# Patient Record
Sex: FEMALE | Race: WHITE | NOT HISPANIC OR LATINO
[De-identification: names, ages, dates, MRNs, and addresses within clinical notes are randomized per-mention and may not be internally consistent; named-entity substitution may affect disease eponyms.]

---

## 2018-12-07 ENCOUNTER — OTHER (OUTPATIENT)
Age: 83
End: 2018-12-07

## 2018-12-10 ENCOUNTER — FORM ENCOUNTER (OUTPATIENT)
Age: 83
End: 2018-12-10

## 2018-12-11 ENCOUNTER — OUTPATIENT (OUTPATIENT)
Dept: OUTPATIENT SERVICES | Facility: HOSPITAL | Age: 83
LOS: 1 days | End: 2018-12-11
Payer: MEDICARE

## 2018-12-11 ENCOUNTER — APPOINTMENT (OUTPATIENT)
Dept: ORTHOPEDIC SURGERY | Facility: CLINIC | Age: 83
End: 2018-12-11
Payer: MEDICARE

## 2018-12-11 ENCOUNTER — APPOINTMENT (OUTPATIENT)
Dept: RADIOLOGY | Facility: CLINIC | Age: 83
End: 2018-12-11
Payer: MEDICARE

## 2018-12-11 VITALS
DIASTOLIC BLOOD PRESSURE: 66 MMHG | TEMPERATURE: 100.2 F | WEIGHT: 166 LBS | BODY MASS INDEX: 26.06 KG/M2 | HEART RATE: 53 BPM | HEIGHT: 67 IN | SYSTOLIC BLOOD PRESSURE: 146 MMHG

## 2018-12-11 DIAGNOSIS — Z86.79 PERSONAL HISTORY OF OTHER DISEASES OF THE CIRCULATORY SYSTEM: ICD-10-CM

## 2018-12-11 DIAGNOSIS — I25.2 OLD MYOCARDIAL INFARCTION: ICD-10-CM

## 2018-12-11 DIAGNOSIS — M25.559 PAIN IN UNSPECIFIED HIP: ICD-10-CM

## 2018-12-11 DIAGNOSIS — Z78.9 OTHER SPECIFIED HEALTH STATUS: ICD-10-CM

## 2018-12-11 DIAGNOSIS — Z00.8 ENCOUNTER FOR OTHER GENERAL EXAMINATION: ICD-10-CM

## 2018-12-11 PROCEDURE — 99205 OFFICE O/P NEW HI 60 MIN: CPT

## 2018-12-11 PROCEDURE — 72170 X-RAY EXAM OF PELVIS: CPT

## 2018-12-11 PROCEDURE — 72170 X-RAY EXAM OF PELVIS: CPT | Mod: 26

## 2018-12-11 PROCEDURE — 73502 X-RAY EXAM HIP UNI 2-3 VIEWS: CPT | Mod: RT

## 2019-01-29 ENCOUNTER — APPOINTMENT (OUTPATIENT)
Dept: ORTHOPEDIC SURGERY | Facility: CLINIC | Age: 84
End: 2019-01-29
Payer: MEDICARE

## 2019-01-29 VITALS
WEIGHT: 166 LBS | HEIGHT: 67 IN | DIASTOLIC BLOOD PRESSURE: 63 MMHG | SYSTOLIC BLOOD PRESSURE: 133 MMHG | TEMPERATURE: 98.2 F | BODY MASS INDEX: 26.06 KG/M2 | HEART RATE: 54 BPM

## 2019-01-29 PROCEDURE — 99213 OFFICE O/P EST LOW 20 MIN: CPT

## 2019-01-29 NOTE — DISCUSSION/SUMMARY
[de-identified] : The patient is a 85 year old female 3 years s/p right THR with six previous dislocations.The patient was recommended to consider revision right hip replacement surgery due to recurrent instability.  She had another dislocation after seeing us in December.  We discussed alternative options including a hip abduction brace which is very cumbersome and we have decided to try to avoid that at this time.  At this point her  is not willing to allow her to have surgery and she would like to postpone surgery.  We discussed that she is at very high risk for recurrent dislocation and further damage to the hip.  She may follow up with us in the future as needed

## 2019-01-29 NOTE — PHYSICAL EXAM
[de-identified] : The patient appears well nourished  and in no apparent distress.  The patient is alert and oriented to person, place, and time.   Affect and mood appear normal. The head is normocephalic and atraumatic.  The eyes reveal normal sclera and extra ocular muscles are intact. The tongue is midline with no apparent lesions.  Skin shows normal turgor with no evidence of eczema or psoriasis.  No respiratory distress noted.  Sensation grossly intact.\par   [de-identified] : Exam of the right hip shows no ecchymosis over buttock, no erythema, no warmth, hip ROM was deferred due to recent dislocation. Prior posterolateral incision is healed well. 5/5 motor strength bilaterally distally. Sensation intact distally.

## 2019-01-29 NOTE — ADDENDUM
[FreeTextEntry1] : This note was authored by Arpit Bateman working as a medical scribe for Dr. Leno Cannon. The note was reviewed, edited, and revised by Dr. Leno Cannon whom is in agreement with the exam findings, imaging findings, and treatment plan. 01/29/2019.

## 2019-01-29 NOTE — HISTORY OF PRESENT ILLNESS
[de-identified] : The patient is a 85 year old female being seen for evaluation of her right hip. She is 3 years s/p right THR with six previous dislocations. Last seen in the office in December of last year at which time she scheduled right hip revision surgery. She reports having suffered the sixth dislocation a month ago after falling while reciprocating stairs. She is ambulating and transferring with a cane. She is here today to discuss her options.  Her  is not in favor of her having surgery because of the fear of her not responding well to the anesthesia.  He "will not let her" have the surgery.

## 2019-03-06 ENCOUNTER — APPOINTMENT (OUTPATIENT)
Dept: ORTHOPEDIC SURGERY | Facility: HOSPITAL | Age: 84
End: 2019-03-06

## 2019-03-21 ENCOUNTER — APPOINTMENT (OUTPATIENT)
Dept: ORTHOPEDIC SURGERY | Facility: CLINIC | Age: 84
End: 2019-03-21

## 2019-04-19 ENCOUNTER — APPOINTMENT (OUTPATIENT)
Dept: ORTHOPEDIC SURGERY | Facility: CLINIC | Age: 84
End: 2019-04-19

## 2021-06-28 ENCOUNTER — APPOINTMENT (OUTPATIENT)
Dept: ORTHOPEDIC SURGERY | Facility: CLINIC | Age: 86
End: 2021-06-28
Payer: MEDICARE

## 2021-06-28 VITALS
HEART RATE: 74 BPM | WEIGHT: 149 LBS | DIASTOLIC BLOOD PRESSURE: 70 MMHG | SYSTOLIC BLOOD PRESSURE: 121 MMHG | HEIGHT: 67 IN | BODY MASS INDEX: 23.39 KG/M2

## 2021-06-28 DIAGNOSIS — Z96.641 PRESENCE OF RIGHT ARTIFICIAL HIP JOINT: ICD-10-CM

## 2021-06-28 PROCEDURE — 99214 OFFICE O/P EST MOD 30 MIN: CPT

## 2021-06-28 PROCEDURE — 73501 X-RAY EXAM HIP UNI 1 VIEW: CPT | Mod: RT

## 2021-06-28 NOTE — DISCUSSION/SUMMARY
[de-identified] : Impression REcurrent dislocation right total hip recommendation\par Recommendation I recommended to patient and her daughter strong consideration for revision surgery.  For now prescription for hip spica brace.  Referral to Dr. Donato FARRAR for possible revision

## 2021-06-28 NOTE — PHYSICAL EXAM
[de-identified] : Constitutional:Well nourished , well developed and in no acute distress\par Psychiatric: Alert and oriented to time place and person.Appropriate affect \par Skin:Head, neck, arms and lower extremities:no lesions or discoloration\par HEENT: Normocephalic, EOM intact, Nasal septum midline,\par Respiratory: Unlabored respirations,no audible wheezing ,no tachypnea, no cyanosis\par Cardiovascular: no leg swelling  no ankle edema no JVD, pulse regular\par Vascular: no calf or thigh tenderness, \par Peripheral pulses; intact\par Lymphatics:No groin adenopathy,no lymphedema lower  or upper extremities\par Right hip knee immobilizer leg length equal reflexes patellar Achilles absent right and left motor power tibialis anterior EHL peroneals 5/5 [de-identified] : AP pelvis right hip AP lateral reveals right cementless total hip replacement with vertically oriented acetabular component crosstable lateral demonstrates satisfactory anteversion

## 2021-06-28 NOTE — DISCUSSION/SUMMARY
[de-identified] : Impression REcurrent dislocation right total hip recommendation\par Recommendation I recommended to patient and her daughter strong consideration for revision surgery.  For now prescription for hip spica brace.  Referral to Dr. Donato FARRAR for possible revision

## 2021-06-28 NOTE — HISTORY OF PRESENT ILLNESS
[de-identified] : This is an 87-year-old female status post right total hip replacement 8 to 10 years ago.  Patient has experienced 8 dislocations the last occurring a week ago.  All dislocations treated by closed reduction.  Most recently was fitted with a knee immobilizer.  A few years ago was recommended for revision surgery but declined because patient's  did not want patient to have surgery.  Patient inquiring about nonoperative measures to prevent dislocation.  Denies neurovascular symptoms or groin pain.

## 2021-06-28 NOTE — PHYSICAL EXAM
[de-identified] : Constitutional:Well nourished , well developed and in no acute distress\par Psychiatric: Alert and oriented to time place and person.Appropriate affect \par Skin:Head, neck, arms and lower extremities:no lesions or discoloration\par HEENT: Normocephalic, EOM intact, Nasal septum midline,\par Respiratory: Unlabored respirations,no audible wheezing ,no tachypnea, no cyanosis\par Cardiovascular: no leg swelling  no ankle edema no JVD, pulse regular\par Vascular: no calf or thigh tenderness, \par Peripheral pulses; intact\par Lymphatics:No groin adenopathy,no lymphedema lower  or upper extremities\par Right hip knee immobilizer leg length equal reflexes patellar Achilles absent right and left motor power tibialis anterior EHL peroneals 5/5 [de-identified] : AP pelvis right hip AP lateral reveals right cementless total hip replacement with vertically oriented acetabular component crosstable lateral demonstrates satisfactory anteversion

## 2021-06-28 NOTE — HISTORY OF PRESENT ILLNESS
[de-identified] : This is an 87-year-old female status post right total hip replacement 8 to 10 years ago.  Patient has experienced 8 dislocations the last occurring a week ago.  All dislocations treated by closed reduction.  Most recently was fitted with a knee immobilizer.  A few years ago was recommended for revision surgery but declined because patient's  did not want patient to have surgery.  Patient inquiring about nonoperative measures to prevent dislocation.  Denies neurovascular symptoms or groin pain.

## 2022-04-04 ENCOUNTER — NON-APPOINTMENT (OUTPATIENT)
Age: 87
End: 2022-04-04

## 2022-04-04 ENCOUNTER — APPOINTMENT (OUTPATIENT)
Dept: CARDIOLOGY | Facility: CLINIC | Age: 87
End: 2022-04-04
Payer: MEDICARE

## 2022-04-04 VITALS
OXYGEN SATURATION: 94 % | HEART RATE: 89 BPM | BODY MASS INDEX: 25.11 KG/M2 | SYSTOLIC BLOOD PRESSURE: 132 MMHG | HEIGHT: 67 IN | WEIGHT: 160 LBS | DIASTOLIC BLOOD PRESSURE: 72 MMHG

## 2022-04-04 PROCEDURE — 93000 ELECTROCARDIOGRAM COMPLETE: CPT

## 2022-04-04 PROCEDURE — 99214 OFFICE O/P EST MOD 30 MIN: CPT

## 2022-04-04 RX ORDER — CARVEDILOL 3.12 MG/1
TABLET, FILM COATED ORAL
Refills: 0 | Status: DISCONTINUED | COMMUNITY
End: 2022-04-04

## 2022-04-04 RX ORDER — LOSARTAN POTASSIUM 100 MG/1
TABLET, FILM COATED ORAL
Refills: 0 | Status: DISCONTINUED | COMMUNITY
End: 2022-04-04

## 2022-04-04 RX ORDER — CIPROFLOXACIN HYDROCHLORIDE 500 MG/1
500 TABLET, FILM COATED ORAL
Qty: 20 | Refills: 0 | Status: ACTIVE | COMMUNITY
Start: 2022-03-28

## 2022-04-04 NOTE — PHYSICAL EXAM
[Normal Rate] : normal [Rhythm Regular] : regular [Normal S1] : normal S1 [Normal S2] : normal S2 [I] : a grade 1 [Nonpitting Edema] : nonpitting edema present [1+] : left 1+ [Right Carotid Bruit] : no bruit heard over the right carotid [Left Carotid Bruit] : no bruit heard over the left carotid [No Cyanosis] : no cyanosis [No Clubbing] : no clubbing [Normal] : alert and oriented, normal memory [de-identified] : walks with ann

## 2022-04-04 NOTE — CARDIOLOGY SUMMARY
[de-identified] : 4/4/22 Sinus  Rhythm  - frequent PAC s \par Voltage criteria for LVH   \par  -Anteroseptal infarct  \par  -Nonspecific ST depression  [de-identified] : 2012

## 2022-04-04 NOTE — DISCUSSION/SUMMARY
[FreeTextEntry1] : 88 year woman with a history as listed presents for an initial cardiac evaluation. \par Randee is complaining of worsening hiip pain and needs a revision of her previous replacement. She denies any anginal symptoms. Clinically she is euvolemic on exam. THough she is signficantly dyspneic. She has not seen a cardiologist in years. She will continue ASA for her PCI history. She will undergo a pharmacological nuclear stress test to assess for underlying obstructive CAD. She will get a 2d echo to assess for any  new structural heart disease, changes in valvular and ventricular function. \par For now will continue with Nifedipine. \par She never took a statin. \par She will have her baseline lab work, including lipids, done prior to the next visit. \par She will followup with me in 1-2 month or sooner if necessary.

## 2022-04-04 NOTE — HISTORY OF PRESENT ILLNESS
[FreeTextEntry1] : 88 year old woman with a history of a CAD s/p PCI in 2012, HTN presents an initial cardiac evaluation. \par \par In 2012 she had a hip replacement that resulted in a post op MI which required a PCI. She has not had any real regular followup with any physician. \par \par She has been very sedentary given her hip issues. She has MARTIN with walking longer distances. She   denies any chest pain, PND, orthopnea, lower extremity edema, near syncope, syncope, strokelike symptoms. \par Medication reconciliation performed. She is compliant with her medications.

## 2022-04-06 ENCOUNTER — NON-APPOINTMENT (OUTPATIENT)
Age: 87
End: 2022-04-06

## 2022-04-08 ENCOUNTER — APPOINTMENT (OUTPATIENT)
Dept: CARDIOLOGY | Facility: CLINIC | Age: 87
End: 2022-04-08
Payer: MEDICARE

## 2022-04-08 PROCEDURE — 93306 TTE W/DOPPLER COMPLETE: CPT

## 2022-04-08 PROCEDURE — 78452 HT MUSCLE IMAGE SPECT MULT: CPT

## 2022-04-08 PROCEDURE — A9500: CPT

## 2022-04-08 PROCEDURE — 93015 CV STRESS TEST SUPVJ I&R: CPT

## 2022-04-11 LAB
25(OH)D3 SERPL-MCNC: 157 NG/ML
ALBUMIN SERPL ELPH-MCNC: 4.2 G/DL
ALP BLD-CCNC: 70 U/L
ALT SERPL-CCNC: 9 U/L
ANION GAP SERPL CALC-SCNC: 12 MMOL/L
AST SERPL-CCNC: 15 U/L
BASOPHILS # BLD AUTO: 0.05 K/UL
BASOPHILS NFR BLD AUTO: 0.7 %
BILIRUB SERPL-MCNC: 0.4 MG/DL
BUN SERPL-MCNC: 17 MG/DL
CALCIUM SERPL-MCNC: 9.9 MG/DL
CHLORIDE SERPL-SCNC: 100 MMOL/L
CHOLEST SERPL-MCNC: 180 MG/DL
CO2 SERPL-SCNC: 28 MMOL/L
CREAT SERPL-MCNC: 0.86 MG/DL
EGFR: 65 ML/MIN/1.73M2
EOSINOPHIL # BLD AUTO: 1.02 K/UL
EOSINOPHIL NFR BLD AUTO: 14.2 %
ESTIMATED AVERAGE GLUCOSE: 120 MG/DL
FOLATE SERPL-MCNC: 11.2 NG/ML
GLUCOSE SERPL-MCNC: 83 MG/DL
HBA1C MFR BLD HPLC: 5.8 %
HCT VFR BLD CALC: 43.7 %
HDLC SERPL-MCNC: 68 MG/DL
HGB BLD-MCNC: 13.7 G/DL
IMM GRANULOCYTES NFR BLD AUTO: 0.1 %
LDLC SERPL CALC-MCNC: 97 MG/DL
LYMPHOCYTES # BLD AUTO: 1.98 K/UL
LYMPHOCYTES NFR BLD AUTO: 27.6 %
MAN DIFF?: NORMAL
MCHC RBC-ENTMCNC: 29 PG
MCHC RBC-ENTMCNC: 31.4 GM/DL
MCV RBC AUTO: 92.4 FL
MONOCYTES # BLD AUTO: 0.71 K/UL
MONOCYTES NFR BLD AUTO: 9.9 %
NEUTROPHILS # BLD AUTO: 3.41 K/UL
NEUTROPHILS NFR BLD AUTO: 47.5 %
NONHDLC SERPL-MCNC: 112 MG/DL
PLATELET # BLD AUTO: 220 K/UL
POTASSIUM SERPL-SCNC: 4.2 MMOL/L
PROT SERPL-MCNC: 7.5 G/DL
RBC # BLD: 4.73 M/UL
RBC # FLD: 13 %
SODIUM SERPL-SCNC: 140 MMOL/L
TRIGL SERPL-MCNC: 74 MG/DL
TSH SERPL-ACNC: 3.58 UIU/ML
VIT B12 SERPL-MCNC: 596 PG/ML
WBC # FLD AUTO: 7.18 K/UL

## 2022-04-20 RX ORDER — CARVEDILOL 6.25 MG/1
6.25 TABLET, FILM COATED ORAL
Qty: 180 | Refills: 2 | Status: ACTIVE | COMMUNITY
Start: 2022-04-20

## 2022-04-20 RX ORDER — NIFEDIPINE 30 MG/1
30 TABLET, EXTENDED RELEASE ORAL
Qty: 180 | Refills: 0 | Status: DISCONTINUED | COMMUNITY
Start: 2021-06-04 | End: 2022-04-20

## 2022-06-06 ENCOUNTER — NON-APPOINTMENT (OUTPATIENT)
Age: 87
End: 2022-06-06

## 2022-06-06 ENCOUNTER — APPOINTMENT (OUTPATIENT)
Dept: CARDIOLOGY | Facility: CLINIC | Age: 87
End: 2022-06-06
Payer: MEDICARE

## 2022-06-06 VITALS
OXYGEN SATURATION: 95 % | SYSTOLIC BLOOD PRESSURE: 150 MMHG | BODY MASS INDEX: 25.43 KG/M2 | HEART RATE: 50 BPM | DIASTOLIC BLOOD PRESSURE: 65 MMHG | HEIGHT: 67 IN | WEIGHT: 162 LBS

## 2022-06-06 DIAGNOSIS — R94.39 ABNORMAL RESULT OF OTHER CARDIOVASCULAR FUNCTION STUDY: ICD-10-CM

## 2022-06-06 PROCEDURE — 93000 ELECTROCARDIOGRAM COMPLETE: CPT

## 2022-06-06 PROCEDURE — 99215 OFFICE O/P EST HI 40 MIN: CPT

## 2022-06-06 NOTE — REVIEW OF SYSTEMS
[Dyspnea on exertion] : dyspnea during exertion [Joint Pain] : joint pain [Negative] : Heme/Lymph done

## 2022-06-06 NOTE — CARDIOLOGY SUMMARY
[de-identified] : SR with LVH nonspecific T wave  [de-identified] : 4/8/22 pharm nuc small mild defect in the mid to distal anterior wall that is partially reversible mild ischemia  [de-identified] : 4/8/22 subtle hypokinesis with overall normal LV function.  [de-identified] : 2012

## 2022-06-06 NOTE — DISCUSSION/SUMMARY
[FreeTextEntry1] : 88 year woman with a history as listed presents for a followup cardiac evaluation. \par Randee is complaining of more worsening MARTIN and has an abnormal nuclear stress test. I offered her a coronary angiogram but she is reluctant to move forward and would like medical management. She will continue Coreg as her HR is controlled. Add Imdur 30mg Qday. \par She will continue ASA 81mg Qday. She never took a statin and is reluctant to start it. \par She will have her baseline lab work, including lipids, done prior to the next visit. \par She will followup with me in 2-4 weeks month or sooner if necessary.

## 2022-06-06 NOTE — PHYSICAL EXAM
[Normal Rate] : normal [Rhythm Regular] : regular [Normal S1] : normal S1 [Normal S2] : normal S2 [I] : a grade 1 [Nonpitting Edema] : nonpitting edema present [1+] : left 1+ [No Cyanosis] : no cyanosis [No Clubbing] : no clubbing [Normal] : alert and oriented, normal memory [Right Carotid Bruit] : no bruit heard over the right carotid [Left Carotid Bruit] : no bruit heard over the left carotid [de-identified] : walks with ann

## 2022-06-06 NOTE — HISTORY OF PRESENT ILLNESS
[FreeTextEntry1] : 88 year old woman with a history of a CAD s/p PCI in 2012, HTN presents an initial cardiac evaluation. \par \par In 2012 she had a hip replacement that resulted in a post op MI which required a PCI. She has not had any real regular followup with any physician. \par \par Since her last visit, she has had more MARTIN on walking.  She   denies any chest pain, PND, orthopnea, lower extremity edema, near syncope, syncope, strokelike symptoms. \par Medication reconciliation performed. She is compliant with her medications.

## 2022-06-29 ENCOUNTER — NON-APPOINTMENT (OUTPATIENT)
Age: 87
End: 2022-06-29

## 2022-06-29 ENCOUNTER — APPOINTMENT (OUTPATIENT)
Dept: CARDIOLOGY | Facility: CLINIC | Age: 87
End: 2022-06-29

## 2022-06-29 VITALS
HEART RATE: 58 BPM | OXYGEN SATURATION: 93 % | HEIGHT: 67 IN | SYSTOLIC BLOOD PRESSURE: 150 MMHG | DIASTOLIC BLOOD PRESSURE: 72 MMHG

## 2022-06-29 VITALS — SYSTOLIC BLOOD PRESSURE: 130 MMHG | DIASTOLIC BLOOD PRESSURE: 60 MMHG

## 2022-06-29 DIAGNOSIS — R06.00 DYSPNEA, UNSPECIFIED: ICD-10-CM

## 2022-06-29 PROCEDURE — 99215 OFFICE O/P EST HI 40 MIN: CPT

## 2022-06-29 PROCEDURE — 93000 ELECTROCARDIOGRAM COMPLETE: CPT

## 2022-06-30 DIAGNOSIS — Z01.818 ENCOUNTER FOR OTHER PREPROCEDURAL EXAMINATION: ICD-10-CM

## 2022-07-01 ENCOUNTER — NON-APPOINTMENT (OUTPATIENT)
Age: 87
End: 2022-07-01

## 2022-07-01 LAB
ALBUMIN SERPL ELPH-MCNC: 4.2 G/DL
ALP BLD-CCNC: 81 U/L
ALT SERPL-CCNC: 9 U/L
ANION GAP SERPL CALC-SCNC: 14 MMOL/L
AST SERPL-CCNC: 19 U/L
BASOPHILS # BLD AUTO: 0.04 K/UL
BASOPHILS NFR BLD AUTO: 0.7 %
BILIRUB SERPL-MCNC: 0.6 MG/DL
BUN SERPL-MCNC: 15 MG/DL
CALCIUM SERPL-MCNC: 9.7 MG/DL
CHLORIDE SERPL-SCNC: 99 MMOL/L
CHOLEST SERPL-MCNC: 187 MG/DL
CO2 SERPL-SCNC: 25 MMOL/L
CREAT SERPL-MCNC: 0.87 MG/DL
EGFR: 64 ML/MIN/1.73M2
EOSINOPHIL # BLD AUTO: 0.05 K/UL
EOSINOPHIL NFR BLD AUTO: 0.9 %
GLUCOSE SERPL-MCNC: 99 MG/DL
HCT VFR BLD CALC: 45.4 %
HDLC SERPL-MCNC: 50 MG/DL
HGB BLD-MCNC: 14.1 G/DL
IMM GRANULOCYTES NFR BLD AUTO: 0.7 %
LDLC SERPL CALC-MCNC: 115 MG/DL
LYMPHOCYTES # BLD AUTO: 1.2 K/UL
LYMPHOCYTES NFR BLD AUTO: 22.5 %
MAN DIFF?: NORMAL
MCHC RBC-ENTMCNC: 28.1 PG
MCHC RBC-ENTMCNC: 31.1 GM/DL
MCV RBC AUTO: 90.4 FL
MONOCYTES # BLD AUTO: 0.55 K/UL
MONOCYTES NFR BLD AUTO: 10.3 %
NEUTROPHILS # BLD AUTO: 3.46 K/UL
NEUTROPHILS NFR BLD AUTO: 64.9 %
NONHDLC SERPL-MCNC: 137 MG/DL
PLATELET # BLD AUTO: 214 K/UL
POTASSIUM SERPL-SCNC: 4.4 MMOL/L
PROT SERPL-MCNC: 8.3 G/DL
RBC # BLD: 5.02 M/UL
RBC # FLD: 14.3 %
SODIUM SERPL-SCNC: 138 MMOL/L
TRIGL SERPL-MCNC: 108 MG/DL
WBC # FLD AUTO: 5.34 K/UL

## 2022-07-01 NOTE — REVIEW OF SYSTEMS
[Joint Pain] : joint pain [Negative] : Heme/Lymph [FreeTextEntry5] : see HPI [FreeTextEntry9] : back pains

## 2022-07-01 NOTE — DISCUSSION/SUMMARY
[FreeTextEntry1] : 88 year woman with a history as listed presents for a followup cardiac evaluation. \par \par Randee is in no acute distress today.  She appears euvolemic on exam.  ECG illustrates normal sinus rhythm, unchanged from previous.  Her blood initially was elevated but improved after resting awhile in the office. \par Her dyspnea and chest pressures have improved since starting imdur 30mg, I have explained in detail with Randee and her DTR Danna that with improvement in symptoms while on nitrates this is reinforcing my suspicion for coronary artery disease and active blockage. She has agreed to proceed with scheduling a coronary angiogram.  In the mean time, I will increase imdur to BID dosing (30mg) and will add Ranexa 500mg BID to her regimen.  she will continue coreg 6.25mg BID as well.\par she will continue ASA 81\par \par I will send her for blood work to evaluate kidney function post fall and in preparation for angiogram.  Will also check LIPID panel .    \par \par She will followup with me in august after angiogram is complete. Sooner if necessary. \par she knows to present to the ED with worsening symptoms.

## 2022-07-01 NOTE — PHYSICAL EXAM
[Normal Rate] : normal [Rhythm Regular] : regular [Normal S1] : normal S1 [Normal S2] : normal S2 [I] : a grade 1 [1+] : left 1+ [No Cyanosis] : no cyanosis [No Clubbing] : no clubbing [Normal] : alert and oriented, normal memory [II] : a grade 2 [No Pitting Edema] : no pitting edema present [Bibasilar Rales/Crackles] : bibasilar rales/crackles were heard [Rales / Crackles Bilateral Bases] : crackles were heard over both bases [Right Carotid Bruit] : no bruit heard over the right carotid [Left Carotid Bruit] : no bruit heard over the left carotid [de-identified] : walks with ann

## 2022-07-01 NOTE — HISTORY OF PRESENT ILLNESS
[FreeTextEntry1] : 88 year old woman with a history of a CAD s/p PCI in 2012, HTN presents an initial cardiac evaluation. \par \par In 2012 she had a hip replacement that resulted in a post op MI which required a PCI. She has not had any real regular followup with any physician. \par \par Previous visit History:\par \par Since her last visit, she has had more MARTIN on walking.  She   denies any chest pain, PND, orthopnea, lower extremity edema, near syncope, syncope, strokelike symptoms. \par Medication reconciliation performed. She is compliant with her medications. \par \par Randee presents back today 6/29/22 for follow up:\par She unfortunately had a fall over a week ago in her home.  Her report of the event was most consistent with a mechanical fall.  she denies dizzy spell, denies loss of consciousness.  the fall was unwitnessed.  After the fall, she was unable to stand due to pain, so she spent the night on the floor until the next morning.  SHe did not seek emergency assessment.   today, she still experiences pain from the right side of her lower back.  She denied chest pain or pressure, denied palpitations at the time. \par \par Overall, today she reports an improvement with her breathlessness since starting the Imdur 30mg daily.  she is able to ambulate with the walker without chest pains or pressure, and breathing is "better".

## 2022-07-01 NOTE — CARDIOLOGY SUMMARY
[de-identified] : SR with LVH nonspecific T wave  [de-identified] : 4/8/22 pharm nuc small mild defect in the mid to distal anterior wall that is partially reversible mild ischemia  [de-identified] : 4/8/22 subtle hypokinesis with overall normal LV function.  [de-identified] : 2012

## 2022-07-01 NOTE — END OF VISIT
[FreeTextEntry3] : I saw and evaluated the patient and discussed the care with the NP provider above on 06/29/2022 . I agree with the findings and plan as documented in the note above. agrees to coornary angiography given anginal sx. will uptitrate anti anginal meds as above. if sx worsens advised to go to ed.

## 2022-07-05 ENCOUNTER — OUTPATIENT (OUTPATIENT)
Dept: OUTPATIENT SERVICES | Facility: HOSPITAL | Age: 87
LOS: 1 days | End: 2022-07-05
Payer: MEDICARE

## 2022-07-05 ENCOUNTER — TRANSCRIPTION ENCOUNTER (OUTPATIENT)
Age: 87
End: 2022-07-05

## 2022-07-05 VITALS
RESPIRATION RATE: 17 BRPM | HEART RATE: 58 BPM | TEMPERATURE: 98 F | OXYGEN SATURATION: 97 % | DIASTOLIC BLOOD PRESSURE: 121 MMHG | SYSTOLIC BLOOD PRESSURE: 138 MMHG

## 2022-07-05 VITALS
SYSTOLIC BLOOD PRESSURE: 171 MMHG | HEIGHT: 67 IN | DIASTOLIC BLOOD PRESSURE: 76 MMHG | WEIGHT: 149.91 LBS | HEART RATE: 61 BPM | RESPIRATION RATE: 18 BRPM | OXYGEN SATURATION: 97 % | TEMPERATURE: 98 F

## 2022-07-05 DIAGNOSIS — Z96.641 PRESENCE OF RIGHT ARTIFICIAL HIP JOINT: Chronic | ICD-10-CM

## 2022-07-05 DIAGNOSIS — R94.39 ABNORMAL RESULT OF OTHER CARDIOVASCULAR FUNCTION STUDY: ICD-10-CM

## 2022-07-05 DIAGNOSIS — Z96.653 PRESENCE OF ARTIFICIAL KNEE JOINT, BILATERAL: Chronic | ICD-10-CM

## 2022-07-05 LAB
ALBUMIN SERPL ELPH-MCNC: 3.7 G/DL — SIGNIFICANT CHANGE UP (ref 3.3–5)
ALP SERPL-CCNC: 135 U/L — HIGH (ref 40–120)
ALT FLD-CCNC: 11 U/L — SIGNIFICANT CHANGE UP (ref 10–45)
ANION GAP SERPL CALC-SCNC: 12 MMOL/L — SIGNIFICANT CHANGE UP (ref 5–17)
AST SERPL-CCNC: 23 U/L — SIGNIFICANT CHANGE UP (ref 10–40)
BILIRUB SERPL-MCNC: 0.6 MG/DL — SIGNIFICANT CHANGE UP (ref 0.2–1.2)
BUN SERPL-MCNC: 13 MG/DL — SIGNIFICANT CHANGE UP (ref 7–23)
CALCIUM SERPL-MCNC: 10 MG/DL — SIGNIFICANT CHANGE UP (ref 8.4–10.5)
CHLORIDE SERPL-SCNC: 103 MMOL/L — SIGNIFICANT CHANGE UP (ref 96–108)
CO2 SERPL-SCNC: 25 MMOL/L — SIGNIFICANT CHANGE UP (ref 22–31)
CREAT SERPL-MCNC: 0.85 MG/DL — SIGNIFICANT CHANGE UP (ref 0.5–1.3)
EGFR: 66 ML/MIN/1.73M2 — SIGNIFICANT CHANGE UP
GLUCOSE SERPL-MCNC: 99 MG/DL — SIGNIFICANT CHANGE UP (ref 70–99)
HCT VFR BLD CALC: 41.9 % — SIGNIFICANT CHANGE UP (ref 34.5–45)
HGB BLD-MCNC: 13.5 G/DL — SIGNIFICANT CHANGE UP (ref 11.5–15.5)
MCHC RBC-ENTMCNC: 29 PG — SIGNIFICANT CHANGE UP (ref 27–34)
MCHC RBC-ENTMCNC: 32.2 GM/DL — SIGNIFICANT CHANGE UP (ref 32–36)
MCV RBC AUTO: 90.1 FL — SIGNIFICANT CHANGE UP (ref 80–100)
NRBC # BLD: 0 /100 WBCS — SIGNIFICANT CHANGE UP (ref 0–0)
PLATELET # BLD AUTO: 170 K/UL — SIGNIFICANT CHANGE UP (ref 150–400)
POTASSIUM SERPL-MCNC: 5.5 MMOL/L — HIGH (ref 3.5–5.3)
POTASSIUM SERPL-SCNC: 5.5 MMOL/L — HIGH (ref 3.5–5.3)
PROT SERPL-MCNC: 8.1 G/DL — SIGNIFICANT CHANGE UP (ref 6–8.3)
RBC # BLD: 4.65 M/UL — SIGNIFICANT CHANGE UP (ref 3.8–5.2)
RBC # FLD: 14.2 % — SIGNIFICANT CHANGE UP (ref 10.3–14.5)
SODIUM SERPL-SCNC: 140 MMOL/L — SIGNIFICANT CHANGE UP (ref 135–145)
WBC # BLD: 5.13 K/UL — SIGNIFICANT CHANGE UP (ref 3.8–10.5)
WBC # FLD AUTO: 5.13 K/UL — SIGNIFICANT CHANGE UP (ref 3.8–10.5)

## 2022-07-05 PROCEDURE — C1894: CPT

## 2022-07-05 PROCEDURE — 93010 ELECTROCARDIOGRAM REPORT: CPT | Mod: 77

## 2022-07-05 PROCEDURE — 92928 PRQ TCAT PLMT NTRAC ST 1 LES: CPT | Mod: LD

## 2022-07-05 PROCEDURE — C1769: CPT

## 2022-07-05 PROCEDURE — 93571 IV DOP VEL&/PRESS C FLO 1ST: CPT | Mod: LD

## 2022-07-05 PROCEDURE — 85027 COMPLETE CBC AUTOMATED: CPT

## 2022-07-05 PROCEDURE — 93571 IV DOP VEL&/PRESS C FLO 1ST: CPT | Mod: 26,LD

## 2022-07-05 PROCEDURE — 99152 MOD SED SAME PHYS/QHP 5/>YRS: CPT

## 2022-07-05 PROCEDURE — 80053 COMPREHEN METABOLIC PANEL: CPT

## 2022-07-05 PROCEDURE — 93005 ELECTROCARDIOGRAM TRACING: CPT

## 2022-07-05 PROCEDURE — 93010 ELECTROCARDIOGRAM REPORT: CPT

## 2022-07-05 PROCEDURE — C9600: CPT | Mod: LD

## 2022-07-05 PROCEDURE — 93454 CORONARY ARTERY ANGIO S&I: CPT | Mod: 26,59

## 2022-07-05 PROCEDURE — 93454 CORONARY ARTERY ANGIO S&I: CPT | Mod: 59

## 2022-07-05 PROCEDURE — C1887: CPT

## 2022-07-05 PROCEDURE — C1874: CPT

## 2022-07-05 PROCEDURE — 36415 COLL VENOUS BLD VENIPUNCTURE: CPT

## 2022-07-05 RX ORDER — SODIUM CHLORIDE 9 MG/ML
1000 INJECTION INTRAMUSCULAR; INTRAVENOUS; SUBCUTANEOUS
Refills: 0 | Status: DISCONTINUED | OUTPATIENT
Start: 2022-07-05 | End: 2022-07-19

## 2022-07-05 RX ORDER — ISOSORBIDE MONONITRATE 60 MG/1
1 TABLET, EXTENDED RELEASE ORAL
Qty: 0 | Refills: 0 | DISCHARGE

## 2022-07-05 RX ORDER — ASPIRIN/CALCIUM CARB/MAGNESIUM 324 MG
1 TABLET ORAL
Qty: 0 | Refills: 0 | DISCHARGE

## 2022-07-05 RX ORDER — SODIUM CHLORIDE 9 MG/ML
3 INJECTION INTRAMUSCULAR; INTRAVENOUS; SUBCUTANEOUS EVERY 8 HOURS
Refills: 0 | Status: DISCONTINUED | OUTPATIENT
Start: 2022-07-05 | End: 2022-07-19

## 2022-07-05 RX ORDER — CLOPIDOGREL BISULFATE 75 MG/1
1 TABLET, FILM COATED ORAL
Qty: 30 | Refills: 6
Start: 2022-07-05 | End: 2023-01-30

## 2022-07-05 RX ORDER — CARVEDILOL PHOSPHATE 80 MG/1
1 CAPSULE, EXTENDED RELEASE ORAL
Qty: 0 | Refills: 0 | DISCHARGE

## 2022-07-05 RX ADMIN — SODIUM CHLORIDE 3 MILLILITER(S): 9 INJECTION INTRAMUSCULAR; INTRAVENOUS; SUBCUTANEOUS at 13:01

## 2022-07-05 RX ADMIN — Medication 200 MILLIGRAM(S): at 17:20

## 2022-07-05 RX ADMIN — SODIUM CHLORIDE 75 MILLILITER(S): 9 INJECTION INTRAMUSCULAR; INTRAVENOUS; SUBCUTANEOUS at 13:00

## 2022-07-05 NOTE — CHART NOTE - NSCHARTNOTEFT_GEN_A_CORE
Removal of Femoral Sheath    Pulses in the right lower extremity are palpable. The patient was placed in the supine position. The insertion site was identified and the sutures were removed per protocol.  The 6 [place] femoral sheath was then removed. Direct pressure was applied for  __18___ minutes.     Monitoring of the right groin and both lower extremities including neuro-vascular checks and vital signs every 15 minutes x 4, then every 30 minutes x 2, then every 1 hour was ordered.    Gauze and Tegaderm dressing placed. Patient instructed to keep extremity straight and that nursing staff will inform them when they can sit up.     Complications: none    Comments:     [facility]  Invasive Cardiology  [phone]

## 2022-07-05 NOTE — ASU DISCHARGE PLAN (ADULT/PEDIATRIC) - CARE PROVIDER_API CALL
Yarely Gastelum)  Internal Medicine  [address] [place] [address]  Phone: [phone]  Fax: [phone]  Follow Up Time: 2 weeks

## 2022-07-05 NOTE — H&P CARDIOLOGY - NSICDXPASTMEDICALHX_GEN_ALL_CORE_FT
PAST MEDICAL HISTORY:  CAD (coronary artery disease)     HLD (hyperlipidemia)     HTN (hypertension)     NSTEMI (non-ST elevated myocardial infarction)

## 2022-07-05 NOTE — H&P CARDIOLOGY - HISTORY OF PRESENT ILLNESS
This is a 89 yo female PMH HTN, HLD, hip replacement 2012 postop MI requiring PCI who presented to Dr. VLAD Gastelum to establish cardiac care with complaints of SOB/MARTIN when walking.  Denies chest pain/pressure, dizziness, diaphoresis, palpitations, nausea, vomiting, peripheral edema, recent weight gain, or syncope.  No implanted monitoring devices. NST 4/8/22 no results available.  Pt. presents asymptomatic for further evaluation and cardiac cath. This is a 87 yo female PMH HTN, HLD, history of falls utilizes cane/walker, hip replacement 2012 postop MI requiring PCI who presented to Dr. VLAD Gastelum to establish cardiac care with complaints of SOB/MARTIN when walking.  Denies chest pain/pressure, dizziness, diaphoresis, palpitations, nausea, vomiting, peripheral edema, recent weight gain, or syncope.  No implanted monitoring devices. NST 4/8/22 no results available.  Pt. presents asymptomatic for further evaluation and cardiac cath.

## 2022-07-05 NOTE — ASU DISCHARGE PLAN (ADULT/PEDIATRIC) - NS MD DC FALL RISK RISK
For information on Fall & Injury Prevention, visit: [email or web address] OR  [email or web address] OR  https://www.cdc.gov/steadi/patient.html

## 2022-07-05 NOTE — ASU PATIENT PROFILE, ADULT - FALL HARM RISK - HARM RISK INTERVENTIONS

## 2022-07-06 LAB — SARS-COV-2 N GENE NPH QL NAA+PROBE: NOT DETECTED

## 2022-07-11 PROBLEM — I21.4 NON-ST ELEVATION (NSTEMI) MYOCARDIAL INFARCTION: Chronic | Status: ACTIVE | Noted: 2022-07-05

## 2022-07-11 PROBLEM — I25.10 ATHEROSCLEROTIC HEART DISEASE OF NATIVE CORONARY ARTERY WITHOUT ANGINA PECTORIS: Chronic | Status: ACTIVE | Noted: 2022-07-05

## 2022-07-11 PROBLEM — E78.5 HYPERLIPIDEMIA, UNSPECIFIED: Chronic | Status: ACTIVE | Noted: 2022-07-05

## 2022-07-11 PROBLEM — I10 ESSENTIAL (PRIMARY) HYPERTENSION: Chronic | Status: ACTIVE | Noted: 2022-07-05

## 2022-07-20 ENCOUNTER — APPOINTMENT (OUTPATIENT)
Dept: CARDIOLOGY | Facility: CLINIC | Age: 87
End: 2022-07-20

## 2022-07-20 ENCOUNTER — NON-APPOINTMENT (OUTPATIENT)
Age: 87
End: 2022-07-20

## 2022-07-20 VITALS — SYSTOLIC BLOOD PRESSURE: 122 MMHG | DIASTOLIC BLOOD PRESSURE: 62 MMHG

## 2022-07-20 VITALS
HEART RATE: 65 BPM | WEIGHT: 155 LBS | SYSTOLIC BLOOD PRESSURE: 167 MMHG | OXYGEN SATURATION: 93 % | DIASTOLIC BLOOD PRESSURE: 85 MMHG | BODY MASS INDEX: 24.33 KG/M2 | HEIGHT: 67 IN

## 2022-07-20 PROCEDURE — 93000 ELECTROCARDIOGRAM COMPLETE: CPT

## 2022-07-20 PROCEDURE — 99214 OFFICE O/P EST MOD 30 MIN: CPT

## 2022-07-20 PROCEDURE — 93880 EXTRACRANIAL BILAT STUDY: CPT

## 2022-07-20 RX ORDER — RANOLAZINE 500 MG/1
500 TABLET, EXTENDED RELEASE ORAL
Qty: 60 | Refills: 3 | Status: DISCONTINUED | COMMUNITY
Start: 2022-06-29 | End: 2022-07-20

## 2022-07-20 RX ORDER — CETIRIZINE HYDROCHLORIDE 10 MG/1
10 TABLET, COATED ORAL
Qty: 10 | Refills: 0 | Status: ACTIVE | COMMUNITY
Start: 2022-06-30

## 2022-07-27 NOTE — DISCUSSION/SUMMARY
[FreeTextEntry1] : 88 year woman with a history as listed presents for a followup cardiac evaluation. \par \par Randee is in no acute distress today.  She appears euvolemic on exam.  ECG illustrates normal sinus rhythm, unchanged from previous.  Her blood pressure initially was elevated but improved after resting awhile in the office. \par She is s/p ASTON to pLAD and POBA to mLAD on 7/5/22.\par Her dyspnea and chest pressures have improved since the angiogram and stenting. She will continue imdur 30mg but is not sure if she is taking it twice a day as was previously prescribed.  She will benefit from continuing given small vessel disease.\par Being she  is not sure if she is taking  Ranexa 500mg BID, at this time, I have advised that she stop given she no longer is having symptoms. \par She will continue coreg 6.25mg BID, she will continue ASA 81 and plavix 75mg. \par \par I have asked that she start low dose statin, Lipitor 20mg daily for goal LDL of 100, ideally less than 70.\par will send her for Carotid Doppler as well.\par \par Regarding her HIP, I have informed her that she would have to continue ASA/Plavix uninterrupted for minimum of 3 months.  In the mean time, she and her family can find an orthopedic that she prefers to follow up with . \par Physical therapy for gait training is encouraged. \par \par \par She will follow up with me in September\par  [EKG obtained to assist in diagnosis and management of assessed problem(s)] : EKG obtained to assist in diagnosis and management of assessed problem(s)

## 2022-07-27 NOTE — END OF VISIT
[FreeTextEntry3] : I saw and evaluated the patient and discussed the care with the NP provider above on 07/20/2022 . I agree with the findings and plan as documented in the note above. cont dapt. optimized meds. compliance stressed.

## 2022-07-27 NOTE — HISTORY OF PRESENT ILLNESS
[FreeTextEntry1] : 88 year old woman with a history of a CAD s/p PCI in 2012, HTN presents an initial cardiac evaluation. \par \par In 2012 she had a hip replacement that resulted in a post op MI which required a PCI. She has not had any real regular followup with any physician. \par \par Previous visit History:\par \par Since her last visit, she has had more MARTIN on walking.  She   denies any chest pain, PND, orthopnea, lower extremity edema, near syncope, syncope, strokelike symptoms. \par Medication reconciliation performed. She is compliant with her medications. \par jama Jeff presents back today 6/29/22 for follow up:\par She unfortunately had a fall over a week ago in her home.  Her report of the event was most consistent with a mechanical fall.  she denies dizzy spell, denies loss of consciousness.  the fall was unwitnessed.  After the fall, she was unable to stand due to pain, so she spent the night on the floor until the next morning.  SHe did not seek emergency assessment.   today, she still experiences pain from the right side of her lower back.  She denied chest pain or pressure, denied palpitations at the time. \par \par Overall, today she reports an improvement with her breathlessness since starting the Imdur 30mg daily.  she is able to ambulate with the walker without chest pains or pressure, and breathing is "better".\par \par jama Jeff presents today 7/20/22 for hospital follow up :\par She is s/p angiogram 7/5/22 with pLAD ASTON placement and mLAD balloon angioplasty of previous stent.\par she feels well, breathing has improved greatly.  Denies chest pains.\[place] Still has not been able to get around much walking. Still using cane and walker.  Was seen by her PCP, was told she may have a pelvis fracture from prior fall.  she will eventually need followup with orthopedic regarding her HIP.\par \par Med reconciliation performed, she is a bit unsure of all of her medications and frequency, she is not sure if she is taking renexa and is unsure of Imdur frequency. \par She is taking plavix and ASA 81 \par \par

## 2022-07-27 NOTE — CARDIOLOGY SUMMARY
[de-identified] : SR with LVH nonspecific T wave  [de-identified] : 4/8/22 pharm nuc small mild defect in the mid to distal anterior wall that is partially reversible mild ischemia  [de-identified] : 4/8/22 subtle hypokinesis with overall normal LV function. mild MR, mild TR [de-identified] : 2012 \par \par 7/5/2022\par ASTON pLAD\par POBA mLAD (prior stent, 2 locations w/worsening stenosis)

## 2022-07-27 NOTE — PHYSICAL EXAM
[Normal Rate] : normal [Rhythm Regular] : regular [Normal S1] : normal S1 [Normal S2] : normal S2 [I] : a grade 1 [II] : a grade 2 [No Pitting Edema] : no pitting edema present [1+] : left 1+ [Bibasilar Rales/Crackles] : bibasilar rales/crackles were heard [Rales / Crackles Bilateral Bases] : crackles were heard over both bases [No Cyanosis] : no cyanosis [No Clubbing] : no clubbing [Normal] : alert and oriented, normal memory [Rt] : varicose veins of the right leg noted [Lt] : varicose veins of the left leg noted [Right Carotid Bruit] : no bruit heard over the right carotid [Left Carotid Bruit] : no bruit heard over the left carotid [de-identified] : walks with ann

## 2022-09-28 ENCOUNTER — NON-APPOINTMENT (OUTPATIENT)
Age: 87
End: 2022-09-28

## 2022-09-28 ENCOUNTER — APPOINTMENT (OUTPATIENT)
Dept: CARDIOLOGY | Facility: CLINIC | Age: 87
End: 2022-09-28

## 2022-09-28 VITALS
OXYGEN SATURATION: 96 % | BODY MASS INDEX: 24.33 KG/M2 | WEIGHT: 155 LBS | DIASTOLIC BLOOD PRESSURE: 73 MMHG | HEIGHT: 67 IN | SYSTOLIC BLOOD PRESSURE: 166 MMHG | HEART RATE: 57 BPM

## 2022-09-28 VITALS — DIASTOLIC BLOOD PRESSURE: 60 MMHG | SYSTOLIC BLOOD PRESSURE: 140 MMHG

## 2022-09-28 DIAGNOSIS — M25.559 PAIN IN UNSPECIFIED HIP: ICD-10-CM

## 2022-09-28 DIAGNOSIS — Z00.00 ENCOUNTER FOR GENERAL ADULT MEDICAL EXAMINATION W/OUT ABNORMAL FINDINGS: ICD-10-CM

## 2022-09-28 DIAGNOSIS — E78.5 HYPERLIPIDEMIA, UNSPECIFIED: ICD-10-CM

## 2022-09-28 DIAGNOSIS — I25.10 ATHEROSCLEROTIC HEART DISEASE OF NATIVE CORONARY ARTERY W/OUT ANGINA PECTORIS: ICD-10-CM

## 2022-09-28 PROCEDURE — 99214 OFFICE O/P EST MOD 30 MIN: CPT

## 2022-09-28 PROCEDURE — 93000 ELECTROCARDIOGRAM COMPLETE: CPT

## 2022-09-28 RX ORDER — ISOSORBIDE MONONITRATE 30 MG/1
30 TABLET, EXTENDED RELEASE ORAL
Qty: 180 | Refills: 3 | Status: DISCONTINUED | COMMUNITY
Start: 2022-06-06 | End: 2022-09-28

## 2022-09-28 NOTE — HISTORY OF PRESENT ILLNESS
[FreeTextEntry1] : 88 year old woman with a history of a CAD s/p PCI in 2012, s/p 7/5/22 pLAD s/p ASTON, HTN, HLD presents an initial cardiac evaluation. \par  \par Since her last visit, she is feeling well. Her exercise tolerance is limited by her hip pain. She Her MARTIN has improved. denies any chest pain, PND, orthopnea, lower extremity edema, near syncope, syncope, strokelike symptoms. \par Medication reconciliation performed. She is compliant with her medications.

## 2022-09-28 NOTE — CARDIOLOGY SUMMARY
[de-identified] : 9/28/22  SB PAC AWMI  [de-identified] :  4/8/22 subtle hypokinesis with overall normal LV function. mild MR, mild TR  [de-identified] :  4/8/22 subtle hypokinesis with overall normal LV function. mild MR, mild TR  [de-identified] : 2012 \par 7/5/2022 ASTON pLAD POBA mLAD (prior stent, 2 locations w/worsening stenosis)

## 2022-09-28 NOTE — ASSESSMENT
[FreeTextEntry1] : 88 year woman with a history as listed presents for a followup cardiac evaluation. \par \par Randee is in no acute distress today. She appears euvolemic on exam. ECG illustrates normal sinus rhythm, unchanged from previous. Her blood pressure initially was elevated but improved after resting awhile in the office. \par She is s/p ASTON to pLAD and POBA to mLAD on 7/5/22.\par \par Her dyspnea and chest pressures have improved since the angiogram and stenting. \par She will continue coreg 6.25mg BID, she will continue ASA 81 and plavix 75mg. I will increase her Imdur to 120mg Qday. \par \par She will increase her lipitor to 40mg daily for goal LDL of 100, ideally less than 70.\par  \par Regarding her HIP, I have informed her that she would have to continue ASA/Plavix uninterrupted for minimum of 3 months (oct 5).  In the mean time, she and her family can find an orthopedic that she prefers to follow up with. \par Physical therapy for gait training is encouraged. \par \par \par She will follow up with me in 3 months. \par

## 2023-01-09 ENCOUNTER — APPOINTMENT (OUTPATIENT)
Dept: CARDIOLOGY | Facility: CLINIC | Age: 88
End: 2023-01-09
Payer: MEDICARE

## 2023-01-09 ENCOUNTER — NON-APPOINTMENT (OUTPATIENT)
Age: 88
End: 2023-01-09

## 2023-01-09 VITALS
WEIGHT: 164 LBS | SYSTOLIC BLOOD PRESSURE: 158 MMHG | OXYGEN SATURATION: 95 % | DIASTOLIC BLOOD PRESSURE: 69 MMHG | HEIGHT: 67 IN | HEART RATE: 64 BPM | BODY MASS INDEX: 25.74 KG/M2

## 2023-01-09 VITALS — SYSTOLIC BLOOD PRESSURE: 130 MMHG | DIASTOLIC BLOOD PRESSURE: 60 MMHG

## 2023-01-09 PROCEDURE — 99214 OFFICE O/P EST MOD 30 MIN: CPT

## 2023-01-09 PROCEDURE — 93000 ELECTROCARDIOGRAM COMPLETE: CPT

## 2023-01-09 NOTE — DISCUSSION/SUMMARY
[FreeTextEntry1] : 89 year woman with a history as listed presents for a followup cardiac evaluation. \par \par Randee is in no acute distress today. She appears euvolemic on exam. ECG illustrates normal sinus rhythm, unchanged from previous.  She is s/p ASTON to pLAD and POBA to mLAD on 7/5/22. Her dyspnea and chest pressures have improved since the angiogram and stenting. \par She will continue coreg 6.25mg BID, she will continue ASA 81 and plavix 75mg.  She will continue  Imdur 120mg Qday. \par \par She will continue her lipitor 40mg daily for goal LDL of 100, ideally less than 70. Repeat lipids in 3 months. \par  \par She is going to PT now. \par \par She will follow up with me in 3 months.  [EKG obtained to assist in diagnosis and management of assessed problem(s)] : EKG obtained to assist in diagnosis and management of assessed problem(s)

## 2023-01-09 NOTE — CARDIOLOGY SUMMARY
[de-identified] : 1/9/23  PAC AWMI  [de-identified] :  4/8/22 subtle hypokinesis with overall normal LV function. mild MR, mild TR  [de-identified] :  4/8/22 subtle hypokinesis with overall normal LV function. mild MR, mild TR  [de-identified] : 2012 \par 7/5/2022 ASTON pLAD POBA mLAD (prior stent, 2 locations w/worsening stenosis)

## 2023-05-04 ENCOUNTER — APPOINTMENT (OUTPATIENT)
Dept: CARDIOLOGY | Facility: CLINIC | Age: 88
End: 2023-05-04

## 2023-07-12 RX ORDER — CLOPIDOGREL BISULFATE 75 MG/1
75 TABLET, FILM COATED ORAL
Qty: 90 | Refills: 3 | Status: ACTIVE | COMMUNITY
Start: 2022-07-05

## 2023-09-20 ENCOUNTER — LABORATORY RESULT (OUTPATIENT)
Age: 88
End: 2023-09-20

## 2023-09-20 ENCOUNTER — APPOINTMENT (OUTPATIENT)
Dept: CARDIOLOGY | Facility: CLINIC | Age: 88
End: 2023-09-20

## 2023-09-20 ENCOUNTER — APPOINTMENT (OUTPATIENT)
Dept: CARDIOLOGY | Facility: CLINIC | Age: 88
End: 2023-09-20
Payer: MEDICARE

## 2023-09-20 VITALS
WEIGHT: 164 LBS | OXYGEN SATURATION: 96 % | DIASTOLIC BLOOD PRESSURE: 70 MMHG | SYSTOLIC BLOOD PRESSURE: 146 MMHG | BODY MASS INDEX: 25.74 KG/M2 | HEART RATE: 54 BPM | HEIGHT: 67 IN

## 2023-09-20 VITALS — DIASTOLIC BLOOD PRESSURE: 78 MMHG | SYSTOLIC BLOOD PRESSURE: 134 MMHG

## 2023-09-20 DIAGNOSIS — R60.0 LOCALIZED EDEMA: ICD-10-CM

## 2023-09-20 PROCEDURE — 93000 ELECTROCARDIOGRAM COMPLETE: CPT

## 2023-09-20 PROCEDURE — 99214 OFFICE O/P EST MOD 30 MIN: CPT

## 2023-09-21 ENCOUNTER — APPOINTMENT (OUTPATIENT)
Dept: CARDIOLOGY | Facility: CLINIC | Age: 88
End: 2023-09-21

## 2024-03-25 ENCOUNTER — APPOINTMENT (OUTPATIENT)
Dept: CARDIOLOGY | Facility: CLINIC | Age: 89
End: 2024-03-25
Payer: MEDICARE

## 2024-03-25 ENCOUNTER — NON-APPOINTMENT (OUTPATIENT)
Age: 89
End: 2024-03-25

## 2024-03-25 VITALS
OXYGEN SATURATION: 93 % | DIASTOLIC BLOOD PRESSURE: 83 MMHG | HEIGHT: 67 IN | SYSTOLIC BLOOD PRESSURE: 161 MMHG | WEIGHT: 169 LBS | HEART RATE: 61 BPM | BODY MASS INDEX: 26.53 KG/M2

## 2024-03-25 PROCEDURE — 99214 OFFICE O/P EST MOD 30 MIN: CPT

## 2024-03-25 PROCEDURE — 93000 ELECTROCARDIOGRAM COMPLETE: CPT

## 2024-03-25 PROCEDURE — G2211 COMPLEX E/M VISIT ADD ON: CPT

## 2024-03-25 RX ORDER — ISOSORBIDE MONONITRATE 120 MG/1
120 TABLET, EXTENDED RELEASE ORAL DAILY
Qty: 90 | Refills: 3 | Status: ACTIVE | COMMUNITY
Start: 2022-09-28 | End: 1900-01-01

## 2024-03-25 NOTE — CARDIOLOGY SUMMARY
[de-identified] : Sinus Bradycardia  Voltage criteria for LVH  -Poor R-wave progression  [de-identified] :  4/8/22 subtle hypokinesis with overall normal LV function. mild MR, mild TR  [de-identified] :  4/8/22 subtle hypokinesis with overall normal LV function. mild MR, mild TR  9/2023 normal LV function [de-identified] : 2012 \par  7/5/2022 ASTON pLAD POBA mLAD (prior stent, 2 locations w/worsening stenosis)

## 2024-03-25 NOTE — DISCUSSION/SUMMARY
[FreeTextEntry1] : 89 year woman with a history as listed presents for a followup cardiac evaluation.   Randee is in no acute distress today. She appears euvolemic on exam. ECG illustrates normal sinus rhythm, unchanged from previous.  She is s/p ASTON to pLAD and POBA to mLAD on 7/5/22. Her dyspnea and chest pressures have improved since the angiogram and stenting. Her residual MARTIN is likely from deconditioning.   Her blood pressure is elevated. She will continue coreg 6.25mg BID, she will continue ASA 81 and plavix 75mg.  She will resume Imdur 120mg Qday. .   She will continue her lipitor 40mg daily for goal LDL of 100, ideally less than 70. At your convenience, please fax me her latest lab results including lipid profile.  Exercise and diet counseling was performed in order to reduce her future cardiovascular risk. She will follow up with me in 6 months.  [EKG obtained to assist in diagnosis and management of assessed problem(s)] : EKG obtained to assist in diagnosis and management of assessed problem(s)

## 2024-03-25 NOTE — HISTORY OF PRESENT ILLNESS
[FreeTextEntry1] : 89 year old woman with a history of a CAD s/p PCI in 2012, s/p 7/5/22 pLAD s/p ASTON, HTN, HLD presents a followup cardiac evaluation.    Since her last visit, she is feeling well.   She has been having dry cough. She is sedentary. She has been worried about her balance.   Her MARTIN has remained stable. She denies any chest pain, PND, orthopnea, lower extremity edema, near syncope, syncope, stroke like symptoms.  She has varicose vein surgery and it ahs improved. Medication reconciliation performed. She is compliant with her medications. She decreased her Imdiur to 90mg Qday for ?dizziness.

## 2024-05-14 ENCOUNTER — RX RENEWAL (OUTPATIENT)
Age: 89
End: 2024-05-14

## 2024-05-14 RX ORDER — ATORVASTATIN CALCIUM 40 MG/1
40 TABLET, FILM COATED ORAL
Qty: 90 | Refills: 1 | Status: ACTIVE | COMMUNITY
Start: 2022-07-20

## 2024-07-12 ENCOUNTER — RX RENEWAL (OUTPATIENT)
Age: 89
End: 2024-07-12

## 2024-09-16 ENCOUNTER — APPOINTMENT (OUTPATIENT)
Dept: CARDIOLOGY | Facility: CLINIC | Age: 89
End: 2024-09-16
Payer: MEDICARE

## 2024-09-16 VITALS
OXYGEN SATURATION: 94 % | HEART RATE: 56 BPM | HEIGHT: 67 IN | DIASTOLIC BLOOD PRESSURE: 59 MMHG | WEIGHT: 161 LBS | SYSTOLIC BLOOD PRESSURE: 136 MMHG | BODY MASS INDEX: 25.27 KG/M2

## 2024-09-16 DIAGNOSIS — I25.10 ATHEROSCLEROTIC HEART DISEASE OF NATIVE CORONARY ARTERY W/OUT ANGINA PECTORIS: ICD-10-CM

## 2024-09-16 DIAGNOSIS — E78.5 HYPERLIPIDEMIA, UNSPECIFIED: ICD-10-CM

## 2024-09-16 PROCEDURE — 93000 ELECTROCARDIOGRAM COMPLETE: CPT

## 2024-09-16 PROCEDURE — 99214 OFFICE O/P EST MOD 30 MIN: CPT

## 2024-09-16 PROCEDURE — G2211 COMPLEX E/M VISIT ADD ON: CPT

## 2024-09-16 NOTE — CARDIOLOGY SUMMARY
[de-identified] :  4/8/22 subtle hypokinesis with overall normal LV function. mild MR, mild TR  [de-identified] : Sinus Bradycardia  Voltage criteria for LVH  -Poor R-wave progression  [de-identified] :  4/8/22 subtle hypokinesis with overall normal LV function. mild MR, mild TR  9/2023 normal LV function [de-identified] : 2012 \par  7/5/2022 ASTON pLAD POBA mLAD (prior stent, 2 locations w/worsening stenosis)

## 2024-09-16 NOTE — HISTORY OF PRESENT ILLNESS
[FreeTextEntry1] : 89 year old woman with a history of a CAD s/p PCI in 2012, s/p 7/5/22 pLAD s/p ASTON, HTN, HLD presents a follow-up cardiac evaluation.    Since her last visit, she is feeling well.   She is sedentary. She is limited by her hip pain.  Her MARTIN has remained stable. She denies any chest pain, PND, orthopnea, lower extremity edema, near syncope, syncope, stroke like symptoms.  She has varicose vein surgery and it has improved. Medication reconciliation performed. She is compliant with her medications.

## 2024-09-16 NOTE — DISCUSSION/SUMMARY
[FreeTextEntry1] : 89 year woman with a history as listed presents for a followup cardiac evaluation.   Randee is in no acute distress today. She appears euvolemic on exam. ECG illustrates normal sinus rhythm, unchanged from previous.  She is s/p ASTON to pLAD and POBA to mLAD on 7/5/22. Her dyspnea and chest pressures have improved since the angiogram and stenting. Her residual MARTIN is likely from deconditioning.   Her blood pressure is elevated. She will continue coreg 6.25mg BID, she will continue ASA 81 and plavix 75mg.  She will continue Imdur 90mg Qday.  She will continue her lipitor 40mg daily for goal LDL of 100, ideally less than 70. At your convenience, please fax me her latest lab results including lipid profile.  Exercise and diet counseling was performed in order to reduce her future cardiovascular risk. She will follow up with me in 6 months.  [EKG obtained to assist in diagnosis and management of assessed problem(s)] : EKG obtained to assist in diagnosis and management of assessed problem(s)

## 2024-09-18 ENCOUNTER — APPOINTMENT (OUTPATIENT)
Dept: ORTHOPEDIC SURGERY | Facility: CLINIC | Age: 89
End: 2024-09-18
Payer: MEDICARE

## 2024-09-18 VITALS — WEIGHT: 160 LBS | HEIGHT: 67 IN | BODY MASS INDEX: 25.11 KG/M2

## 2024-09-18 DIAGNOSIS — Z96.641 PRESENCE OF RIGHT ARTIFICIAL HIP JOINT: ICD-10-CM

## 2024-09-18 PROCEDURE — 99204 OFFICE O/P NEW MOD 45 MIN: CPT

## 2024-09-18 PROCEDURE — 73502 X-RAY EXAM HIP UNI 2-3 VIEWS: CPT

## 2024-09-18 NOTE — ADDENDUM
[FreeTextEntry1] : This note was written by Jill Madsen on 09/18/2024 acting solely as a scribe for Dr. Nate Brar.  All medical record entries made by the Scribe were at my, Dr. Nate Brar, direction and personally dictated by me on 09/18/2024. I have personally reviewed the chart and agree that the record accurately reflects my personal performance of the history, physical exam, assessment and plan.

## 2024-09-18 NOTE — PHYSICAL EXAM
[de-identified] : Constitutional: Well nourished , well developed and in no acute distress Psychiatric: Alert and oriented to time place and person.Appropriate affect . Skin: Head, neck, arms and lower extremities:no lesions or discoloration HEENT: Normocephalic, EOM intact, Nasal septum midline, Respiratory: Unlabored respirations,no audible wheezing ,no tachypnea, no cyanosis Cardiovascular: No leg swelling no ankle edema no JVD, pulse regular Vascular: No calf or thigh tenderness, Peripheral pulses: intact Lymphatics: No groin adenopathy,no lymphedema lower or upper extremities     o Right Hip Exam:   Inspection/Palpation : no tenderness, no swelling, no deformity Leg Lengths: equal Passive Range of Motion: flexion 90 degrees, internal  25  degrees, external  45  degrees, abduction 45   degrees, adduction  30  degrees Strength: resisted hip flexion 5/5, resisted hip abduction 5/5 Motor Strength: Peroneals, EHL, and tibialis anterior 5/5 Reflexes: Patella 1+ R=L, Achilles 1+ R=L Skin : no erythema, no ecchymosis Sensation : sensation to light touch intact Vascular Exam : no edema, no cyanosis. [de-identified] : X-rays of the AP pelvis and AP, lateral of the right hip obtained today 09/18/2024 demonstrates total hip replacement components.

## 2024-09-18 NOTE — DISCUSSION/SUMMARY
[de-identified] : 89 year female old with right hip pain. We discussed at length the nature of the patient's condition. We discussed all options and conservative measures, such as ice, NSAIDs, physical therapy, exercise limitations, and injections. We discussed possible surgical intervention in detail with the patient.  A referral for consult to Dr. Donato Montiel for possible total hip replacement revision was provided to the patient.   Follow up as needed  The patient understood and verbally agreed to the treatment plan. All of their questions were answered. The patient should call the office if they have any questions or experience worsening symptoms.

## 2024-09-18 NOTE — HISTORY OF PRESENT ILLNESS
[de-identified] : ADALI VILLALBA is a 89 year old female presenting to the office complaining of right hip pain. Patient presents ambulating with a cane, Patient reports pain for 10 years. 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 Her previous surgeon recommended a revision surgery, but the patient denied. She reports no pain but is concerned about falling due to an improper gait.  Denies any trauma or injury. Denies any numbness or tingling of the lower extremities.

## 2025-08-27 ENCOUNTER — NON-APPOINTMENT (OUTPATIENT)
Age: 89
End: 2025-08-27

## 2025-09-08 ENCOUNTER — APPOINTMENT (OUTPATIENT)
Dept: CARDIOLOGY | Facility: CLINIC | Age: 89
End: 2025-09-08